# Patient Record
Sex: FEMALE | Race: BLACK OR AFRICAN AMERICAN | NOT HISPANIC OR LATINO | URBAN - NONMETROPOLITAN AREA
[De-identification: names, ages, dates, MRNs, and addresses within clinical notes are randomized per-mention and may not be internally consistent; named-entity substitution may affect disease eponyms.]

---

## 2023-10-05 ENCOUNTER — OFFICE VISIT (OUTPATIENT)
Dept: FAMILY MEDICINE | Facility: CLINIC | Age: 33
End: 2023-10-05
Payer: COMMERCIAL

## 2023-10-05 VITALS
HEART RATE: 76 BPM | BODY MASS INDEX: 30.9 KG/M2 | SYSTOLIC BLOOD PRESSURE: 146 MMHG | DIASTOLIC BLOOD PRESSURE: 92 MMHG | OXYGEN SATURATION: 99 % | HEIGHT: 63 IN | RESPIRATION RATE: 18 BRPM | WEIGHT: 174.38 LBS | TEMPERATURE: 98 F

## 2023-10-05 DIAGNOSIS — R50.9 FEVER, UNSPECIFIED FEVER CAUSE: Primary | ICD-10-CM

## 2023-10-05 DIAGNOSIS — J20.9 ACUTE BRONCHITIS, UNSPECIFIED ORGANISM: ICD-10-CM

## 2023-10-05 DIAGNOSIS — J02.9 SORE THROAT: ICD-10-CM

## 2023-10-05 LAB
CTP QC/QA: YES
CTP QC/QA: YES
FLUAV AG NPH QL: NEGATIVE
FLUBV AG NPH QL: NEGATIVE
S PYO RRNA THROAT QL PROBE: NEGATIVE
SARS-COV-2 AG RESP QL IA.RAPID: NEGATIVE

## 2023-10-05 PROCEDURE — 99203 PR OFFICE/OUTPT VISIT, NEW, LEVL III, 30-44 MIN: ICD-10-PCS | Mod: ,,, | Performed by: FAMILY MEDICINE

## 2023-10-05 PROCEDURE — 87880 STREP A ASSAY W/OPTIC: CPT | Mod: QW,,, | Performed by: FAMILY MEDICINE

## 2023-10-05 PROCEDURE — 87428 SARSCOV & INF VIR A&B AG IA: CPT | Mod: QW,,, | Performed by: FAMILY MEDICINE

## 2023-10-05 PROCEDURE — 99203 OFFICE O/P NEW LOW 30 MIN: CPT | Mod: ,,, | Performed by: FAMILY MEDICINE

## 2023-10-05 PROCEDURE — 87880 POCT RAPID STREP A: ICD-10-PCS | Mod: QW,,, | Performed by: FAMILY MEDICINE

## 2023-10-05 PROCEDURE — 87428 POCT SARS-COV2 (COVID) WITH FLU ANTIGEN: ICD-10-PCS | Mod: QW,,, | Performed by: FAMILY MEDICINE

## 2023-10-05 RX ORDER — PRAZOSIN HYDROCHLORIDE 2 MG/1
2 CAPSULE ORAL NIGHTLY
COMMUNITY
Start: 2023-06-20

## 2023-10-05 RX ORDER — QUETIAPINE FUMARATE 50 MG/1
50 TABLET, FILM COATED ORAL NIGHTLY
COMMUNITY
Start: 2023-06-20

## 2023-10-05 RX ORDER — LABETALOL 100 MG/1
100 TABLET, FILM COATED ORAL 2 TIMES DAILY
COMMUNITY
Start: 2023-05-04

## 2023-10-05 RX ORDER — FLUTICASONE PROPIONATE 50 MCG
1 SPRAY, SUSPENSION (ML) NASAL DAILY
Qty: 16 G | Refills: 0 | Status: SHIPPED | OUTPATIENT
Start: 2023-10-05 | End: 2023-11-04

## 2023-10-05 RX ORDER — BENZONATATE 100 MG/1
100 CAPSULE ORAL 3 TIMES DAILY PRN
Qty: 30 CAPSULE | Refills: 0 | Status: SHIPPED | OUTPATIENT
Start: 2023-10-05 | End: 2023-10-15

## 2023-10-05 RX ORDER — IPRATROPIUM BROMIDE 21 UG/1
2 SPRAY, METERED NASAL 3 TIMES DAILY
Qty: 30 ML | Refills: 0 | Status: SHIPPED | OUTPATIENT
Start: 2023-10-05

## 2023-10-05 RX ORDER — BUPROPION HYDROCHLORIDE 300 MG/1
300 TABLET ORAL DAILY
COMMUNITY
Start: 2023-06-20

## 2023-10-05 RX ORDER — AZITHROMYCIN 250 MG/1
TABLET, FILM COATED ORAL
Qty: 6 TABLET | Refills: 0 | Status: SHIPPED | OUTPATIENT
Start: 2023-10-05 | End: 2023-10-10

## 2023-10-05 RX ORDER — DIVALPROEX SODIUM 500 MG/1
500 TABLET, DELAYED RELEASE ORAL 2 TIMES DAILY
COMMUNITY
Start: 2023-06-20

## 2023-10-05 NOTE — PROGRESS NOTES
Norm Matson DO   [facility]  [id number] [address] [address]  [phone]      PATIENT NAME: Jerry Pineda  : 1990  DATE: 10/5/23  MRN: [id number]      Billing Provider: Norm Matson DO  Level of Service:   Patient PCP Information       Provider PCP Type    Norm Matson DO General            Reason for Visit / Chief Complaint: Fever (Fever for the last 48 hours.), Cough (Productive cough for the last day or two.  Unsure of color of sputum.  Also reports loss of taste.), Sore Throat (Reports that throat is sore and feels swollen.), Chills (Chills for the last 48 hours.), Nausea (Nausea and vomiting that has been going on for the past 48 hours.), and Headache (Headache that started 48 hours ago.)       Update PCP  Update Chief Complaint         History of Present Illness / Problem Focused Workflow     Jerry Pineda presents to the clinic with Fever (Fever for the last 48 hours.), Cough (Productive cough for the last day or two.  Unsure of color of sputum.  Also reports loss of taste.), Sore Throat (Reports that throat is sore and feels swollen.), Chills (Chills for the last 48 hours.), Nausea (Nausea and vomiting that has been going on for the past 48 hours.), and Headache (Headache that started 48 hours ago.)     HPI as noted.  Patient is a 33-year-old male presenting with multiple complaints for the last 2 days.  Patient denies chest pain, shortness of breath, and palpitations.        Review of Systems     Review of Systems   Constitutional:  Positive for chills and fever. Negative for diaphoresis.   HENT:  Positive for congestion. Negative for sore throat.    Respiratory:  Positive for cough. Negative for shortness of breath.    Cardiovascular:  Negative for chest pain and palpitations.   Gastrointestinal:  Positive for nausea and vomiting. Negative for abdominal pain, constipation and diarrhea.   Genitourinary:  Negative for dysuria and hematuria.    Skin:  Negative for rash.   Neurological:  Negative for dizziness, light-headedness and headaches.       Medical / Social / Family History     Past Medical History:   Diagnosis Date    Bipolar disorder     Hypertension     PTSD (post-traumatic stress disorder)     Schizophrenia     Seizures        Past Surgical History:   Procedure Laterality Date     SECTION       SECTION       SECTION       SECTION      HERNIA REPAIR         Social History  Ms. Pineda  reports that she has quit smoking. Her smoking use included cigarettes. She has never been exposed to tobacco smoke. She has never used smokeless tobacco. She reports current alcohol use. She reports that she does not currently use drugs.    Family History  Ms.'s Pineda family history includes Cancer in her mother; Dementia in her father and mother; Diabetes in her father and mother; Hypertension in her father and mother.    Medications and Allergies     Medications  Outpatient Medications Marked as Taking for the 10/5/23 encounter (Office Visit) with Norm Matson, DO   Medication Sig Dispense Refill    buPROPion (WELLBUTRIN XL) 300 MG 24 hr tablet Take 300 mg by mouth once daily.      divalproex (DEPAKOTE) 500 MG TbEC Take 500 mg by mouth 2 (two) times daily.      labetaloL (NORMODYNE) 100 MG tablet Take 100 mg by mouth 2 (two) times daily.      prazosin (MINIPRESS) 2 MG Cap Take 2 mg by mouth every evening.      QUEtiapine (SEROQUEL) 50 MG tablet Take 50 mg by mouth every evening.         Allergies  Review of patient's allergies indicates:  No Known Allergies    Physical Examination     Vitals:    10/05/23 1622   BP: (!) 146/92   Pulse:    Resp:    Temp:      Physical Exam  Vitals and nursing note reviewed.   Constitutional:       General: She is not in acute distress.     Appearance: She is not ill-appearing, toxic-appearing or diaphoretic.   HENT:      Head: Normocephalic and atraumatic.      Right Ear: External ear normal.       Left Ear: External ear normal.      Mouth/Throat:      Mouth: Mucous membranes are moist.      Pharynx: No oropharyngeal exudate or posterior oropharyngeal erythema.   Eyes:      General: No scleral icterus.        Right eye: No discharge.         Left eye: No discharge.      Extraocular Movements: Extraocular movements intact.      Conjunctiva/sclera: Conjunctivae normal.      Pupils: Pupils are equal, round, and reactive to light.   Neck:      Vascular: No carotid bruit.   Cardiovascular:      Rate and Rhythm: Normal rate and regular rhythm.      Heart sounds: No murmur heard.     No friction rub. No gallop.   Pulmonary:      Effort: No respiratory distress.      Breath sounds: No stridor. No wheezing, rhonchi or rales.   Abdominal:      General: Abdomen is flat.      Tenderness: There is no abdominal tenderness. There is no guarding.   Musculoskeletal:         General: No swelling.      Right lower leg: No edema.      Left lower leg: No edema.   Skin:     General: Skin is warm and dry.      Coloration: Skin is not jaundiced.   Neurological:      Mental Status: She is alert and oriented to person, place, and time.         Assessment and Plan (including Health Maintenance)      Problem List  Smart Sets  Document Outside HM   :    Plan:         Health Maintenance Due   Topic Date Due    Hepatitis C Screening  Never done    Cervical Cancer Screening  Never done    Lipid Panel  Never done    COVID-19 Vaccine (1) Never done    HIV Screening  Never done    TETANUS VACCINE  Never done    Influenza Vaccine (1) Never done       Problem List Items Addressed This Visit          Pulmonary    Acute bronchitis    Current Assessment & Plan     Patient is presenting with multiple complaints.  Complaints seem to be predominantly respiratory in nature.  Patient may be suffering from an acute bronchitis.  Antibiotics prescribed as bacterial involvement can not be excluded.   Medications provided for symptomatic relief.  Patient  advised at length to call, return to clinic or present to the ED should symptoms persist or worsen.  Patient understands and agrees with the plan of care.          Other Visit Diagnoses       Fever, unspecified fever cause    -  Primary    Relevant Orders    POCT rapid strep A (Completed)    POCT SARS-COV2 (COVID) with Flu Antigen (Completed)    Sore throat        Relevant Orders    POCT rapid strep A (Completed)    POCT SARS-COV2 (COVID) with Flu Antigen (Completed)            The patient has no Health Maintenance topics of status Not Due    No future appointments.         Signature:  Norm Matson DO  [facility] [address] [place] [address]  [phone]    Date of encounter: 10/5/23

## 2023-12-03 PROBLEM — J20.9 ACUTE BRONCHITIS: Status: ACTIVE | Noted: 2023-12-03

## 2023-12-04 NOTE — ASSESSMENT & PLAN NOTE
Patient is presenting with multiple complaints.  Complaints seem to be predominantly respiratory in nature.  Patient may be suffering from an acute bronchitis.  Antibiotics prescribed as bacterial involvement can not be excluded.   Medications provided for symptomatic relief.  Patient advised at length to call, return to clinic or present to the ED should symptoms persist or worsen.  Patient understands and agrees with the plan of care.